# Patient Record
Sex: FEMALE | Race: BLACK OR AFRICAN AMERICAN | Employment: FULL TIME | URBAN - METROPOLITAN AREA
[De-identification: names, ages, dates, MRNs, and addresses within clinical notes are randomized per-mention and may not be internally consistent; named-entity substitution may affect disease eponyms.]

---

## 2020-09-17 ENCOUNTER — OFFICE VISIT (OUTPATIENT)
Dept: CARDIOLOGY CLINIC | Age: 55
End: 2020-09-17

## 2020-09-17 VITALS
SYSTOLIC BLOOD PRESSURE: 138 MMHG | DIASTOLIC BLOOD PRESSURE: 67 MMHG | TEMPERATURE: 97.1 F | HEIGHT: 61 IN | HEART RATE: 75 BPM | BODY MASS INDEX: 44.93 KG/M2 | WEIGHT: 238 LBS

## 2020-09-17 DIAGNOSIS — E66.01 SEVERE OBESITY (BMI >= 40) (HCC): ICD-10-CM

## 2020-09-17 DIAGNOSIS — R07.9 CHEST PAIN, UNSPECIFIED TYPE: Primary | ICD-10-CM

## 2020-09-17 DIAGNOSIS — I10 ESSENTIAL HYPERTENSION WITH GOAL BLOOD PRESSURE LESS THAN 130/85: ICD-10-CM

## 2020-09-17 DIAGNOSIS — R06.02 SOB (SHORTNESS OF BREATH) ON EXERTION: ICD-10-CM

## 2020-09-17 RX ORDER — METFORMIN HYDROCHLORIDE 1000 MG/1
1000 TABLET ORAL DAILY
Status: ON HOLD | COMMUNITY
End: 2020-11-03 | Stop reason: SDUPTHER

## 2020-09-17 NOTE — PROGRESS NOTES
HISTORY OF PRESENT ILLNESS  Bipin Miner is a 54 y.o. female. 2/16 Taking a new BP med since 4/26/16    Chest Pain (Angina)    The history is provided by the patient. This is a new problem. The current episode started more than 2 days ago (4 days). The problem has been rapidly improving. Duration of episode(s) is 20 minutes. The pain is associated with exertion and stress (walking 15 mts in mall). The pain is present in the lateral region, substernal region and left side. The quality of the pain is described as pressure-like. The pain does not radiate. Associated symptoms include dizziness, lower extremity edema, palpitations and shortness of breath. Pertinent negatives include no claudication, no cough, no diaphoresis, no fever, no headaches, no malaise/fatigue, no nausea, no orthopnea, no PND and no vomiting. She has tried nothing for the symptoms. Hypertension   The history is provided by the medical records. This is a chronic problem. Associated symptoms include chest pain and shortness of breath. Pertinent negatives include no headaches. Dizziness   The history is provided by the patient. This is a chronic (with SOB) problem. The current episode started more than 1 week ago (2016). The problem has not changed since onset. Associated symptoms include chest pain and shortness of breath. Pertinent negatives include no headaches. The symptoms are aggravated by bending. New Patient   The history is provided by the patient and medical records. Associated symptoms include chest pain and shortness of breath. Pertinent negatives include no headaches. Shortness of Breath   The history is provided by the patient. This is a new problem. The problem occurs intermittently. The current episode started more than 1 week ago (3/20). Associated symptoms include chest pain and leg swelling.  Pertinent negatives include no fever, no headaches, no cough, no wheezing, no PND, no orthopnea, no vomiting, no rash and no claudication. The problem's precipitants include exercise (15 mts of walk). Leg Swelling   The history is provided by the patient. This is a new problem. The current episode started more than 1 week ago. The problem occurs every several days. Associated symptoms include chest pain and shortness of breath. Pertinent negatives include no headaches. The symptoms are aggravated by standing. The symptoms are relieved by sleep. Review of Systems   Constitutional: Negative for chills, diaphoresis, fever, malaise/fatigue and weight loss. HENT: Negative for nosebleeds. Eyes: Negative for discharge. Respiratory: Positive for shortness of breath. Negative for cough and wheezing. Cardiovascular: Positive for chest pain, palpitations and leg swelling. Negative for orthopnea, claudication and PND. Gastrointestinal: Negative for diarrhea, nausea and vomiting. Genitourinary: Negative for dysuria and hematuria. Musculoskeletal: Negative for joint pain. Skin: Negative for rash. Neurological: Positive for dizziness. Negative for seizures, loss of consciousness and headaches. Endo/Heme/Allergies: Negative for polydipsia. Does not bruise/bleed easily. Psychiatric/Behavioral: Negative for depression and substance abuse. The patient does not have insomnia.       No Known Allergies    Past Medical History:   Diagnosis Date    Anemia     Diabetes ([facility] [address]     Fibroid tumor 2012    Hyperlipidemia     Hypertension     Type 2 diabetes mellitus ([facility] [address] 5/20/2016    poss neuropathy per pt        Family History   Problem Relation Age of Onset    Hypertension Mother     Heart Disease Mother     Cancer Mother     Hypertension Father     Heart Attack Neg Hx     Stroke Neg Hx        Social History     Tobacco Use    Smoking status: Never Smoker   Substance Use Topics    Alcohol use: No    Drug use: No        Current Outpatient Medications   Medication Sig    metFORMIN (GLUCOPHAGE) 1,000 mg tablet Take 1,000 mg by mouth daily.  metoprolol tartrate (LOPRESSOR) 25 mg tablet Take  by mouth two (2) times a day.  spironolactone (ALDACTONE) 25 mg tablet Take  by mouth daily.  losartan-hydrochlorothiazide (HYZAAR) 100-25 mg per tablet Take 1 Tab by mouth daily.  aspirin delayed-release 81 mg tablet Take  by mouth daily. No current facility-administered medications for this visit. History reviewed. No pertinent surgical history. Diagnostic Studies: Old records reviewed and show as follows  EKG tracings reviewed by me today. CARDIOLOGY STUDIES 6/2/2014   Exercise Nuclear Stress Test Result nl scan, nl EF   Echocardiogram - Complete Result 65%EF, mild sept LVH/DD, tr MR, ? PFO by color   Some recent data might be hidden   5/16 Nuc Stress  Diagnosis:    1. Nondiagnostic EKG portion of Lexiscan stress test.  2. Nuclear imaging report to follow. Conclusion:    1. Normal perfusion scan.    2. Normal wall motion and ejection fraction.    3. Low risk scan    Visit Vitals  /67   Pulse 75   Temp 97.1 °F (36.2 °C) (Temporal)   Ht 5' 1\" (1.549 m)   Wt 108 kg (238 lb)   BMI 44.97 kg/m²       Ms. Lucero Kauffman has a reminder for a \"due or due soon\" health maintenance. I have asked that she contact her primary care provider for follow-up on this health maintenance. Physical Exam   Constitutional: She is oriented to person, place, and time. She appears well-developed and well-nourished. No distress. sev obesity   HENT:   Head: Normocephalic and atraumatic. Mouth/Throat: Normal dentition. Eyes: Right eye exhibits no discharge. Left eye exhibits no discharge. No scleral icterus. Neck: Neck supple. No JVD present. Carotid bruit is not present. No thyromegaly present. Cardiovascular: Normal rate, regular rhythm, S1 normal, S2 normal and intact distal pulses. Exam reveals no gallop and no friction rub. Murmur heard.    Harsh early systolic murmur is present with a grade of 3/6 at the upper right sternal border. High-pitched blowing midsystolic murmur of grade 2/6 is also present at the apex. Pulmonary/Chest: Effort normal and breath sounds normal. She has no wheezes. She has no rales. Abdominal: Soft. She exhibits no mass. There is no abdominal tenderness. Musculoskeletal:         General: No edema. Lymphadenopathy:        Right cervical: No superficial cervical adenopathy present. Left cervical: No superficial cervical adenopathy present. Neurological: She is alert and oriented to person, place, and time. Skin: Skin is warm and dry. No rash noted. Psychiatric: She has a normal mood and affect. Her behavior is normal.       ASSESSMENT and PLAN    Patient has symptoms of chest pain and shortness of breath. Given multiple risk factors of hypertension, diabetes and hyperlipidemia, check echo and a stress test.  Patient advised to lose weight to avoid cardiovascular risks in future. She understands and will try. Continue the same medications for now. Diagnoses and all orders for this visit:    1. Chest pain, unspecified type  -     NUCLEAR CARDIAC STRESS TEST; Future    2. Essential hypertension with goal blood pressure less than 130/85  -     AMB POC EKG ROUTINE W/ 12 LEADS, INTER & REP    3. Severe obesity (BMI >= 40) (HCC)    4. SOB (shortness of breath) on exertion  -     ECHO ADULT COMPLETE; Future        Pertinent laboratory and test data reviewed and discussed with patient.   See patient instructions also for other medical advice given    Medications Discontinued During This Encounter   Medication Reason    cholecalciferol (VITAMIN D3) 1,000 unit tablet Therapy Completed    amLODIPine (NORVASC) 5 mg tablet     carvedilol (COREG) 25 mg tablet     VALSARTAN/HYDROCHLOROTHIAZIDE (DIOVAN HCT PO)        Follow-up and Dispositions    · Return in about 2 weeks (around 10/1/2020), or if symptoms worsen or fail to improve, for same day post test.

## 2020-09-17 NOTE — PATIENT INSTRUCTIONS
Medications Discontinued During This Encounter Medication Reason  cholecalciferol (VITAMIN D3) 1,000 unit tablet Therapy Completed  amLODIPine (NORVASC) 5 mg tablet  carvedilol (COREG) 25 mg tablet  VALSARTAN/HYDROCHLOROTHIAZIDE (DIOVAN HCT PO) Body Mass Index: Care Instructions Your Care Instructions Body mass index (BMI) can help you see if your weight is raising your risk for health problems. It uses a formula to compare how much you weigh with how tall you are. · A BMI lower than 18.5 is considered underweight. · A BMI between 18.5 and 24.9 is considered healthy. · A BMI between 25 and 29.9 is considered overweight. A BMI of 30 or higher is considered obese. If your BMI is in the normal range, it means that you have a lower risk for weight-related health problems. If your BMI is in the overweight or obese range, you may be at increased risk for weight-related health problems, such as high blood pressure, heart disease, stroke, arthritis or joint pain, and diabetes. If your BMI is in the underweight range, you may be at increased risk for health problems such as fatigue, lower protection (immunity) against illness, muscle loss, bone loss, hair loss, and hormone problems. BMI is just one measure of your risk for weight-related health problems. You may be at higher risk for health problems if you are not active, you eat an unhealthy diet, or you drink too much alcohol or use tobacco products. Follow-up care is a key part of your treatment and safety. Be sure to make and go to all appointments, and call your doctor if you are having problems. It's also a good idea to know your test results and keep a list of the medicines you take. How can you care for yourself at home? · Practice healthy eating habits. This includes eating plenty of fruits, vegetables, whole grains, lean protein, and low-fat dairy. · If your doctor recommends it, get more exercise.  Walking is a good choice. Bit by bit, increase the amount you walk every day. Try for at least 30 minutes on most days of the week. · Do not smoke. Smoking can increase your risk for health problems. If you need help quitting, talk to your doctor about stop-smoking programs and medicines. These can increase your chances of quitting for good. · Limit alcohol to 2 drinks a day for men and 1 drink a day for women. Too much alcohol can cause health problems. If you have a BMI higher than 25 · Your doctor may do other tests to check your risk for weight-related health problems. This may include measuring the distance around your waist. A waist measurement of more than 40 inches in men or 35 inches in women can increase the risk of weight-related health problems. · Talk with your doctor about steps you can take to stay healthy or improve your health. You may need to make lifestyle changes to lose weight and stay healthy, such as changing your diet and getting regular exercise. If you have a BMI lower than 18.5 · Your doctor may do other tests to check your risk for health problems. · Talk with your doctor about steps you can take to stay healthy or improve your health. You may need to make lifestyle changes to gain or maintain weight and stay healthy, such as getting more healthy foods in your diet and doing exercises to build muscle. Where can you learn more? Go to http://www.correa.com/ Enter S176 in the search box to learn more about \"Body Mass Index: Care Instructions. \" Current as of: December 11, 2019               Content Version: 12.6 © 9640-2356 [employer]. Care instructions adapted under license by [employer] (which disclaims liability or warranty for this information).  If you have questions about a medical condition or this instruction, always ask your healthcare professional. Antoinette Alfaro disclaims any warranty or liability for your use of this information. [employer] Thank you for requesting access to [employer]. Please follow the instructions below to securely access and download your online medical record. [employer] allows you to send messages to your doctor, view your test results, renew your prescriptions, schedule appointments, and more. How Do I Sign Up? 1. In your internet browser, go to [email or web address]. 2. Click on the First Time User? Click Here link in the Sign In box. You will see the New Member Sign Up page. 3. Enter your [employer] Access Code exactly as it appears below. You will not need to use this code after youve completed the sign-up process. If you do not sign up before the expiration date, you must request a new code. [employer] Access Code: [email or web address] Expires: 2020  1:14 PM (This is the date your [employer] access code will ) 4. Enter the last four digits of your Social Security Number (xxxx) and Date of Birth (mm/dd/yyyy) as indicated and click Submit. You will be taken to the next sign-up page. 5. Create a Favoe ID. This will be your [employer] login ID and cannot be changed, so think of one that is secure and easy to remember. 6. Create a [employer] password. You can change your password at any time. 7. Enter your Password Reset Question and Answer. This can be used at a later time if you forget your password. 8. Enter your e-mail address. You will receive e-mail notification when new information is available in [address] 9. Click Sign Up. You can now view and download portions of your medical record. 10. Click the Download Summary menu link to download a portable copy of your medical information. Additional Information If you have questions, please visit the Frequently Asked Questions section of the [employer] website at [email or web address] Remember, [employer] is NOT to be used for urgent needs.  For medical emergencies, dial 911.

## 2020-10-15 ENCOUNTER — OFFICE VISIT (OUTPATIENT)
Dept: CARDIOLOGY CLINIC | Age: 55
End: 2020-10-15
Payer: COMMERCIAL

## 2020-10-15 VITALS
DIASTOLIC BLOOD PRESSURE: 63 MMHG | HEART RATE: 56 BPM | WEIGHT: 236 LBS | HEIGHT: 61 IN | BODY MASS INDEX: 44.56 KG/M2 | SYSTOLIC BLOOD PRESSURE: 137 MMHG

## 2020-10-15 DIAGNOSIS — I25.110 CORONARY ARTERY DISEASE INVOLVING NATIVE CORONARY ARTERY OF NATIVE HEART WITH UNSTABLE ANGINA PECTORIS (HCC): ICD-10-CM

## 2020-10-15 DIAGNOSIS — I10 ESSENTIAL HYPERTENSION WITH GOAL BLOOD PRESSURE LESS THAN 130/85: ICD-10-CM

## 2020-10-15 DIAGNOSIS — I25.110 CORONARY ARTERY DISEASE INVOLVING NATIVE CORONARY ARTERY OF NATIVE HEART WITH UNSTABLE ANGINA PECTORIS (HCC): Primary | ICD-10-CM

## 2020-10-15 DIAGNOSIS — E66.01 SEVERE OBESITY (BMI >= 40) (HCC): ICD-10-CM

## 2020-10-15 PROCEDURE — 99214 OFFICE O/P EST MOD 30 MIN: CPT | Performed by: INTERNAL MEDICINE

## 2020-10-15 RX ORDER — AMLODIPINE BESYLATE 2.5 MG/1
2.5 TABLET ORAL DAILY
Qty: 30 TAB | Refills: 1 | Status: SHIPPED | OUTPATIENT
Start: 2020-10-15 | End: 2020-12-07

## 2020-10-15 RX ORDER — ASPIRIN 81 MG/1
162 TABLET ORAL DAILY
Qty: 100 TAB
Start: 2020-10-15 | End: 2020-11-03

## 2020-10-15 NOTE — H&P (VIEW-ONLY)
HISTORY OF PRESENT ILLNESS Sean Nixon is a 54 y.o. female. 2/16 Taking a new BP med since 4/26/16 Chest Pain (Angina) The history is provided by the patient. This is a new problem. The current episode started more than 1 week ago (4 days9/20). The problem has not changed since onset. Duration of episode(s) is 20 minutes. The pain is associated with exertion and stress (walking 15 mts in mall). The pain is present in the lateral region, substernal region and left side. The quality of the pain is described as pressure-like. The pain does not radiate. Associated symptoms include dizziness, lower extremity edema, palpitations and shortness of breath. Pertinent negatives include no claudication, no cough, no diaphoresis, no fever, no headaches, no malaise/fatigue, no nausea, no orthopnea, no PND and no vomiting. She has tried nothing for the symptoms. Hypertension The history is provided by the medical records. This is a chronic problem. Associated symptoms include chest pain and shortness of breath. Pertinent negatives include no headaches. Dizziness The history is provided by the patient. This is a chronic (with SOB) problem. The current episode started more than 1 week ago (2016). The problem has not changed since onset. Associated symptoms include chest pain and shortness of breath. Pertinent negatives include no headaches. The symptoms are aggravated by bending. Shortness of Breath The history is provided by the patient. This is a new problem. The problem occurs intermittently. The current episode started more than 1 week ago (3/20). The problem has not changed since onset. Associated symptoms include chest pain and leg swelling. Pertinent negatives include no fever, no headaches, no cough, no wheezing, no PND, no orthopnea, no vomiting, no rash and no claudication. The problem's precipitants include exercise (15 mts of walk). Leg Swelling The history is provided by the patient. This is a new problem. The current episode started more than 1 week ago. The problem occurs every several days. The problem has not changed since onset. Associated symptoms include chest pain and shortness of breath. Pertinent negatives include no headaches. The symptoms are aggravated by standing. The symptoms are relieved by sleep. Cholesterol Problem The history is provided by the medical records. This is a chronic problem. Associated symptoms include chest pain and shortness of breath. Pertinent negatives include no headaches. Review of Systems Constitutional: Negative for chills, diaphoresis, fever, malaise/fatigue and weight loss. HENT: Negative for nosebleeds. Eyes: Negative for discharge. Respiratory: Positive for shortness of breath. Negative for cough and wheezing. Cardiovascular: Positive for chest pain, palpitations and leg swelling. Negative for orthopnea, claudication and PND. Gastrointestinal: Negative for diarrhea, nausea and vomiting. Genitourinary: Negative for dysuria and hematuria. Musculoskeletal: Negative for joint pain. Skin: Negative for rash. Neurological: Positive for dizziness. Negative for seizures, loss of consciousness and headaches. Endo/Heme/Allergies: Negative for polydipsia. Does not bruise/bleed easily. Psychiatric/Behavioral: Negative for depression and substance abuse. The patient does not have insomnia. No Known Allergies Past Medical History:  
Diagnosis Date  Anemia  Diabetes ([address]  Fibroid tumor 2012  Hyperlipidemia  Hypertension  Type 2 diabetes mellitus ([facility] [address] 5/20/2016 poss neuropathy per pt Family History Problem Relation Age of Onset  Hypertension Mother  Heart Disease Mother  Cancer Mother  Hypertension Father  Heart Attack Neg Hx  Stroke Neg Hx Social History Tobacco Use  Smoking status: Never Smoker  Smokeless tobacco: Never Used Substance Use Topics  Alcohol use: No  
 Drug use: No  
  
 
Current Outpatient Medications Medication Sig  
 metFORMIN (GLUCOPHAGE) 1,000 mg tablet Take 1,000 mg by mouth daily.  metoprolol tartrate (LOPRESSOR) 25 mg tablet Take  by mouth two (2) times a day.  spironolactone (ALDACTONE) 25 mg tablet Take  by mouth daily.  losartan-hydrochlorothiazide (HYZAAR) 100-25 mg per tablet Take 1 Tab by mouth daily.  aspirin delayed-release 81 mg tablet Take  by mouth daily. No current facility-administered medications for this visit. Facility-Administered Medications Ordered in Other Visits Medication  regadenoson (LEXISCAN) injection 0.4 mg  
 technetium TC-99 tetrofosimin (TC-MYOVIEW) 1.38 mg injection 9.8 millicurie  technetium TC-99 tetrofosimin (TC-MYOVIEW) 1.38 mg injection 26-31 millicurie History reviewed. No pertinent surgical history. Diagnostic Studies: Old records reviewed and show as follows EKG tracings reviewed by me today. CARDIOLOGY STUDIES 6/2/2014 Exercise Nuclear Stress Test Result nl scan, nl EF Echocardiogram - Complete Result 65%EF, mild sept LVH/DD, tr MR, ? PFO by color Some recent data might be hidden 5/16 Nuc Stress Diagnosis:   
1. Nondiagnostic EKG portion of Lexiscan stress test. 
2. Nuclear imaging report to follow. Conclusion:   
1. Normal perfusion scan.   
2. Normal wall motion and ejection fraction.   
3. Low risk scan 
10/15/20 ECHO ADULT COMPLETE 10/15/2020 10/15/2020 Narrative · LV: Estimated LVEF is 60 - 65%. Normal cavity size, wall thickness and  
systolic function (ejection fraction normal). Wall motion: normal. Mild  
(grade 1) left ventricular diastolic dysfunction. · RV: Normal right ventricular size and function. · MV: Mild mitral annular calcification. Mild mitral valve regurgitation  
is present. · PV: Mild pulmonic valve regurgitation is present. Signed by: Allison Gupta MD  
 
10/15/20 NUCLEAR CARDIAC STRESS TEST 10/15/2020 10/15/2020 Narrative · Baseline ECG: Normal sinus rhythm, non-specific ST-T wave abnormalities. · Negative stress test. 
· Gated SPECT: Left ventricular function post-stress was normal.  
Calculated ejection fraction is 90%. There is no evidence of transient  
ischemic dilation (TID). The TID ratio is 0.82. · Myocardial perfusion imaging defect 1: There is a defect that is small  
to moderate in size with a mild reduction in uptake present in the  
basal-mid anterior location(s) that is reversible. There is normal wall  
motion in the defect area. Viability in the area is good. The defect  
appears to be ischemia caused by breast attenuation. The possibility of  
artifact cannot be excluded. · Myocardial perfusion imaging defect 1: caused by breast attenuation. · Left ventricular perfusion is abnormal. 
· Myocardial perfusion imaging supports an intermediate risk stress test. 
   
  Signed by: Allison Gupta MD  
 
Visit Vitals /63 Pulse (!) 56 Ht 5' 1\" (1.549 m) Wt 107 kg (236 lb) BMI 44.59 kg/m² Ms. Estephania Collazo has a reminder for a \"due or due soon\" health maintenance. I have asked that she contact her primary care provider for follow-up on this health maintenance. Physical Exam  
Constitutional: She is oriented to person, place, and time. She appears well-developed and well-nourished. No distress. sev obesity HENT:  
Head: Normocephalic and atraumatic. Mouth/Throat: Normal dentition. Eyes: Right eye exhibits no discharge. Left eye exhibits no discharge. No scleral icterus. Neck: Neck supple. No JVD present. Carotid bruit is not present. No thyromegaly present. Cardiovascular: Normal rate, regular rhythm, S1 normal, S2 normal and intact distal pulses. Exam reveals no gallop and no friction rub. Murmur heard. Harsh early systolic murmur is present with a grade of 3/6 at the upper right sternal border. High-pitched blowing midsystolic murmur of grade 2/6 is also present at the apex. Pulmonary/Chest: Effort normal and breath sounds normal. She has no wheezes. She has no rales. Abdominal: Soft. She exhibits no mass. There is no abdominal tenderness. Musculoskeletal:     
   General: No edema. Lymphadenopathy:  
     Right cervical: No superficial cervical adenopathy present. Left cervical: No superficial cervical adenopathy present. Neurological: She is alert and oriented to person, place, and time. Skin: Skin is warm and dry. No rash noted. Psychiatric: She has a normal mood and affect. Her behavior is normal.  
 
 
ASSESSMENT and PLAN Patient has symptoms of chest pain and shortness of breath. Given multiple risk factors of hypertension, diabetes and hyperlipidemia, check echo and a stress test. 
Patient advised to lose weight to avoid cardiovascular risks in future. She understands and will try. Continue the same medications for now. 10/20 echo has shown normal ejection fraction and no significant valve disease. Nuclear stress test that showed reversible anterior ischemia indicating likely LAD disease. She will need a cardiac catheterization. Procedure discussed along with risks and complications she agrees. Add Norvasc 2.5 mg a day. The benefits and risks of cardiac catheterization have been discussed in detail with the patient present at the time.  Patient understands  risk of potential cath complications including but not limited to bleeding, infection, difficulty healing the arteriotomy access site(2 chances in 100) which may require surgical repair, potential thromboembolic complications which could result in stroke, myocardial infarction, vascular injury, loss of limb or organ function and/or death( 1 chance in 1000)and potential allergic reaction to contrast dye or other medication used during the procedure. Patient is also aware of the therapeutic implications for medical management vs coronary artery bypass surgery vs percutaneous coronary intervention in treatment of coronary artery disease. The additional risks for percutaneous coronary artery intervention include the need for emergent bypass surgery at 1 chance in 100 and for restenosis which may range from 35% for plain balloon angioplasty, 15% for bare metal stent, and 5% for drug eluting stent implants. The need for mandatory uninterrupted dual antiplatelet therapy with lifelong Aspirin combined with Plavix for up to 12 months following drug eluting stents, and minimum 1 month following bare metal stents to prevent stent thrombosis which is the equivalent of acute heart attack has been reviewed in detail. No contraindications to use of drug eluting stents has been discovered. Diagnoses and all orders for this visit: 1. Coronary artery disease involving native coronary artery of native heart with unstable angina pectoris ([address] -     aspirin delayed-release 81 mg tablet; Take 2 Tabs by mouth daily. 
-     PROTHROMBIN TIME + INR; Future -     METABOLIC PANEL, BASIC; Future -     CBC W/O DIFF; Future -     XR CHEST PA LAT; Future -     URINALYSIS W/ RFLX MICROSCOPIC; Future 
-     PTT; Future 2. Essential hypertension with goal blood pressure less than 130/85 
-     PROTHROMBIN TIME + INR; Future -     METABOLIC PANEL, BASIC; Future -     CBC W/O DIFF; Future -     XR CHEST PA LAT; Future -     URINALYSIS W/ RFLX MICROSCOPIC; Future 
-     PTT; Future 3. Severe obesity (BMI >= 40) ([facility]) Other orders 
-     amLODIPine (NORVASC) 2.5 mg tablet; Take 1 Tab by mouth daily. Pertinent laboratory and test data reviewed and discussed with patient. See patient instructions also for other medical advice given Medications Discontinued During This Encounter Medication Reason  aspirin delayed-release 81 mg tablet Reorder Follow-up and Dispositions · Return in about 2 months (around 12/15/2020), or if symptoms worsen or fail to improve, for post procedure.

## 2020-10-15 NOTE — PATIENT INSTRUCTIONS
Medications Discontinued During This Encounter Medication Reason  aspirin delayed-release 81 mg tablet Reorder Coronary Angiogram: About This Test 
What is a coronary angiogram? 
 
A coronary angiogram is a test to look at the large blood vessels of your heart (coronary arteries). These blood vessels feed blood, oxygen, and nutrients to your heart. Why is this test done? This test is done to check blood flow in your coronary arteries. It can show the size and location of narrowed or blocked sections of an artery. It's done for people who have coronary artery disease, also known as heart disease. The test can show how serious the disease is and how best to treat it. Or it can be done for people who have symptoms of heart disease to find out if there is a problem with the artery. How is the test done? · You will get medicine to help you relax. · A thin tube called a catheter is put into a blood vessel in your groin or arm. · You will get a shot to numb the skin where the catheter goes in. You may feel pressure when the doctor moves the catheter through your blood vessel into your heart. · Dye is put into your coronary arteries through the catheter. Your doctor can see the dye as it moves through the arteries. This lets your doctor look for areas that are narrowed or blocked. · You may feel hot or flushed for several seconds when the dye is put in. 
· If you have a narrowed or blocked artery, the doctor may do an angioplasty or a coronary stent procedure. These procedures make more room for blood to flow. How long does it take? The test will take about 30 minutes. But you need time to get ready for it and time to recover. If you also have angioplasty and possibly a stent placed after the test, the whole procedure may take a few hours.  
What happens after the test? 
· You will stay in a room for at least a few hours to make sure the catheter site starts to heal. You may have a bandage or a compression device on your groin or arm to prevent bleeding. · If the catheter was placed in your groin, you may lie in bed for a few hours. If the catheter was put in your arm, you will need to keep your arm still for at least 1 hour. · You may be able to go home later the same day, or you may need to stay in the hospital overnight. You will get more instructions for what to do at home. · Drink plenty of fluids for several hours after the test. 
Follow-up care is a key part of your treatment and safety. Be sure to make and go to all appointments, and call your doctor if you are having problems. It's also a good idea to know your test results and keep a list of the medicines you take. Where can you learn more? Go to http://www.gray.com/ Enter A446 in the search box to learn more about \"Coronary Angiogram: About This Test.\" Current as of: December 16, 2019               Content Version: 12.6 © 5086-6196 [employer]. Care instructions adapted under license by [employer] (which disclaims liability or warranty for this information). If you have questions about a medical condition or this instruction, always ask your healthcare professional. [address] any warranty or liability for your use of this information. Instructions Patients Name:  Yudelka Kaba 1. You are scheduled to have a Cath/PCI on 11/3/2020  at [facility] Please check in at          . 2. Please go to [facility] and park in the outpatient parking lot that is located around to the back of the hospital and enter through the [facility] building. Once you enter through the [facility] check in with the  there. The  will either give you directions or assist you in getting to the cath holding area. 3. You are not to eat anything after midnight before the procedure. Please continue to drink fluids up until 12:00.  (water, juice, black coffee, soft drinks). Do not drink milk or milk products or anything with cream. You may take medications(except for diabetes) with a small sip of water before 6am on the day of the procedure. Sunny Mccrary 4. If you are diabetic, do not take your insulin/sugar pill the morning of the procedure. 5. MEDICATION INSTRUCTIONS:   Please take your morning medications with the following special instructions: 
 
[x]          Please make sure to take your Blood pressure medication : With just enough water to swallow. [x]          Take your Aspirin and/or Plavix. With just enough water to swallow. []          Stop your Coumadin on and do not resume it until after the procedure.  
 
[]          Take Prednisone 60 mg and Benadryl 25 mg by mouth at Bedtime on  and again on  at . This is to prevent you from having an allergic reaction to the dye. 6. We encourage families to wait in the waiting room on the first floor while the procedure is being done. The Doctor will come out and talk with you as soon as the procedure is over. 7. There is the possibility that you may spend the night in the hospital, depending on the results of the procedure. This will be determined after the procedure is done. If angioplasty or stent is planned, you will stay at least one day. 8. If you or your family have any questions, please call our office Monday Friday, 9:00 a. m.4:30 p.m.,  At [phone], and ask to speak to one of the nurses.

## 2020-10-15 NOTE — PROGRESS NOTES
HISTORY OF PRESENT ILLNESS  Mitzi Corral is a 54 y.o. female. 2/16 Taking a new BP med since 4/26/16    Chest Pain (Angina)    The history is provided by the patient. This is a new problem. The current episode started more than 1 week ago (4 days9/20). The problem has not changed since onset. Duration of episode(s) is 20 minutes. The pain is associated with exertion and stress (walking 15 mts in mall). The pain is present in the lateral region, substernal region and left side. The quality of the pain is described as pressure-like. The pain does not radiate. Associated symptoms include dizziness, lower extremity edema, palpitations and shortness of breath. Pertinent negatives include no claudication, no cough, no diaphoresis, no fever, no headaches, no malaise/fatigue, no nausea, no orthopnea, no PND and no vomiting. She has tried nothing for the symptoms. Hypertension   The history is provided by the medical records. This is a chronic problem. Associated symptoms include chest pain and shortness of breath. Pertinent negatives include no headaches. Dizziness   The history is provided by the patient. This is a chronic (with SOB) problem. The current episode started more than 1 week ago (2016). The problem has not changed since onset. Associated symptoms include chest pain and shortness of breath. Pertinent negatives include no headaches. The symptoms are aggravated by bending. Shortness of Breath   The history is provided by the patient. This is a new problem. The problem occurs intermittently. The current episode started more than 1 week ago (3/20). The problem has not changed since onset. Associated symptoms include chest pain and leg swelling. Pertinent negatives include no fever, no headaches, no cough, no wheezing, no PND, no orthopnea, no vomiting, no rash and no claudication. The problem's precipitants include exercise (15 mts of walk). Leg Swelling   The history is provided by the patient.  This is a new problem. The current episode started more than 1 week ago. The problem occurs every several days. The problem has not changed since onset. Associated symptoms include chest pain and shortness of breath. Pertinent negatives include no headaches. The symptoms are aggravated by standing. The symptoms are relieved by sleep. Cholesterol Problem   The history is provided by the medical records. This is a chronic problem. Associated symptoms include chest pain and shortness of breath. Pertinent negatives include no headaches. Review of Systems   Constitutional: Negative for chills, diaphoresis, fever, malaise/fatigue and weight loss. HENT: Negative for nosebleeds. Eyes: Negative for discharge. Respiratory: Positive for shortness of breath. Negative for cough and wheezing. Cardiovascular: Positive for chest pain, palpitations and leg swelling. Negative for orthopnea, claudication and PND. Gastrointestinal: Negative for diarrhea, nausea and vomiting. Genitourinary: Negative for dysuria and hematuria. Musculoskeletal: Negative for joint pain. Skin: Negative for rash. Neurological: Positive for dizziness. Negative for seizures, loss of consciousness and headaches. Endo/Heme/Allergies: Negative for polydipsia. Does not bruise/bleed easily. Psychiatric/Behavioral: Negative for depression and substance abuse. The patient does not have insomnia.       No Known Allergies    Past Medical History:   Diagnosis Date    Anemia     Diabetes [facility] [address]     Fibroid tumor 2012    Hyperlipidemia     Hypertension     Type 2 diabetes mellitus ([facility] [address] 5/20/2016    poss neuropathy per pt        Family History   Problem Relation Age of Onset    Hypertension Mother     Heart Disease Mother     Cancer Mother     Hypertension Father     Heart Attack Neg Hx     Stroke Neg Hx        Social History     Tobacco Use    Smoking status: Never Smoker    Smokeless tobacco: Never Used   Substance Use Topics    Alcohol use: No    Drug use: No        Current Outpatient Medications   Medication Sig    metFORMIN (GLUCOPHAGE) 1,000 mg tablet Take 1,000 mg by mouth daily.  metoprolol tartrate (LOPRESSOR) 25 mg tablet Take  by mouth two (2) times a day.  spironolactone (ALDACTONE) 25 mg tablet Take  by mouth daily.  losartan-hydrochlorothiazide (HYZAAR) 100-25 mg per tablet Take 1 Tab by mouth daily.  aspirin delayed-release 81 mg tablet Take  by mouth daily. No current facility-administered medications for this visit. Facility-Administered Medications Ordered in Other Visits   Medication    regadenoson (LEXISCAN) injection 0.4 mg    technetium TC-99 tetrofosimin (TC-MYOVIEW) 1.38 mg injection 9.8 millicurie    technetium TC-99 tetrofosimin (TC-MYOVIEW) 1.38 mg injection 02-28 millicurie        History reviewed. No pertinent surgical history. Diagnostic Studies: Old records reviewed and show as follows  EKG tracings reviewed by me today. CARDIOLOGY STUDIES 6/2/2014   Exercise Nuclear Stress Test Result nl scan, nl EF   Echocardiogram - Complete Result 65%EF, mild sept LVH/DD, tr MR, ? PFO by color   Some recent data might be hidden   5/16 Nuc Stress  Diagnosis:    1. Nondiagnostic EKG portion of Lexiscan stress test.  2. Nuclear imaging report to follow. Conclusion:    1. Normal perfusion scan.    2. Normal wall motion and ejection fraction.    3. Low risk scan  10/15/20   ECHO ADULT COMPLETE 10/15/2020 10/15/2020    Narrative · LV: Estimated LVEF is 60 - 65%. Normal cavity size, wall thickness and   systolic function (ejection fraction normal). Wall motion: normal. Mild   (grade 1) left ventricular diastolic dysfunction. · RV: Normal right ventricular size and function. · MV: Mild mitral annular calcification. Mild mitral valve regurgitation   is present. · PV: Mild pulmonic valve regurgitation is present.         Signed by: Kisha Blancas MD     10/15/20   NUCLEAR CARDIAC STRESS TEST 10/15/2020 10/15/2020    Narrative · Baseline ECG: Normal sinus rhythm, non-specific ST-T wave abnormalities. · Negative stress test.  · Gated SPECT: Left ventricular function post-stress was normal.   Calculated ejection fraction is 90%. There is no evidence of transient   ischemic dilation (TID). The TID ratio is 0.82. · Myocardial perfusion imaging defect 1: There is a defect that is small   to moderate in size with a mild reduction in uptake present in the   basal-mid anterior location(s) that is reversible. There is normal wall   motion in the defect area. Viability in the area is good. The defect   appears to be ischemia caused by breast attenuation. The possibility of   artifact cannot be excluded. · Myocardial perfusion imaging defect 1: caused by breast attenuation. · Left ventricular perfusion is abnormal.  · Myocardial perfusion imaging supports an intermediate risk stress test.        Signed by: Chan Hines MD     Visit Vitals  /63   Pulse (!) 56   Ht 5' 1\" (1.549 m)   Wt 107 kg (236 lb)   BMI 44.59 kg/m²       Ms. Jayla Ely has a reminder for a \"due or due soon\" health maintenance. I have asked that she contact her primary care provider for follow-up on this health maintenance. Physical Exam   Constitutional: She is oriented to person, place, and time. She appears well-developed and well-nourished. No distress. sev obesity   HENT:   Head: Normocephalic and atraumatic. Mouth/Throat: Normal dentition. Eyes: Right eye exhibits no discharge. Left eye exhibits no discharge. No scleral icterus. Neck: Neck supple. No JVD present. Carotid bruit is not present. No thyromegaly present. Cardiovascular: Normal rate, regular rhythm, S1 normal, S2 normal and intact distal pulses. Exam reveals no gallop and no friction rub. Murmur heard. Harsh early systolic murmur is present with a grade of 3/6 at the upper right sternal border.   High-pitched blowing midsystolic murmur of grade 2/6 is also present at the apex. Pulmonary/Chest: Effort normal and breath sounds normal. She has no wheezes. She has no rales. Abdominal: Soft. She exhibits no mass. There is no abdominal tenderness. Musculoskeletal:         General: No edema. Lymphadenopathy:        Right cervical: No superficial cervical adenopathy present. Left cervical: No superficial cervical adenopathy present. Neurological: She is alert and oriented to person, place, and time. Skin: Skin is warm and dry. No rash noted. Psychiatric: She has a normal mood and affect. Her behavior is normal.       ASSESSMENT and PLAN    Patient has symptoms of chest pain and shortness of breath. Given multiple risk factors of hypertension, diabetes and hyperlipidemia, check echo and a stress test.  Patient advised to lose weight to avoid cardiovascular risks in future. She understands and will try. Continue the same medications for now. 10/20 echo has shown normal ejection fraction and no significant valve disease. Nuclear stress test that showed reversible anterior ischemia indicating likely LAD disease. She will need a cardiac catheterization. Procedure discussed along with risks and complications she agrees. Add Norvasc 2.5 mg a day. The benefits and risks of cardiac catheterization have been discussed in detail with the patient present at the time. Patient understands  risk of potential cath complications including but not limited to bleeding, infection, difficulty healing the arteriotomy access site(2 chances in 100) which may require surgical repair, potential thromboembolic complications which could result in stroke, myocardial infarction, vascular injury, loss of limb or organ function and/or death( 1 chance in 1000)and potential allergic reaction to contrast dye or other medication used during the procedure.  Patient is also aware of the therapeutic implications for medical management vs coronary artery bypass surgery vs percutaneous coronary intervention in treatment of coronary artery disease. The additional risks for percutaneous coronary artery intervention include the need for emergent bypass surgery at 1 chance in 100 and for restenosis which may range from 35% for plain balloon angioplasty, 15% for bare metal stent, and 5% for drug eluting stent implants. The need for mandatory uninterrupted dual antiplatelet therapy with lifelong Aspirin combined with Plavix for up to 12 months following drug eluting stents, and minimum 1 month following bare metal stents to prevent stent thrombosis which is the equivalent of acute heart attack has been reviewed in detail. No contraindications to use of drug eluting stents has been discovered. Diagnoses and all orders for this visit:    1. Coronary artery disease involving native coronary artery of native heart with unstable angina pectoris (HCC)  -     aspirin delayed-release 81 mg tablet; Take 2 Tabs by mouth daily.  -     PROTHROMBIN TIME + INR; Future  -     METABOLIC PANEL, BASIC; Future  -     CBC W/O DIFF; Future  -     XR CHEST PA LAT; Future  -     URINALYSIS W/ RFLX MICROSCOPIC; Future  -     PTT; Future    2. Essential hypertension with goal blood pressure less than 130/85  -     PROTHROMBIN TIME + INR; Future  -     METABOLIC PANEL, BASIC; Future  -     CBC W/O DIFF; Future  -     XR CHEST PA LAT; Future  -     URINALYSIS W/ RFLX MICROSCOPIC; Future  -     PTT; Future    3. Severe obesity (BMI >= 40) ([facility])    Other orders  -     amLODIPine (NORVASC) 2.5 mg tablet; Take 1 Tab by mouth daily. Pertinent laboratory and test data reviewed and discussed with patient.   See patient instructions also for other medical advice given    Medications Discontinued During This Encounter   Medication Reason    aspirin delayed-release 81 mg tablet Reorder       Follow-up and Dispositions    · Return in about 2 months (around 12/15/2020), or if symptoms worsen or fail to improve, for post procedure.

## 2020-10-27 LAB
ANION GAP SERPL CALC-SCNC: 10 MMOL/L (ref 3–15)
APTT PPP: 23 SEC (ref 22–36)
BILIRUB UR QL: NEGATIVE
BUN SERPL-MCNC: 15 MG/DL (ref 6–22)
CALCIUM SERPL-MCNC: 9.9 MG/DL (ref 8.4–10.5)
CHLORIDE SERPL-SCNC: 100 MMOL/L (ref 98–110)
CLARITY: CLEAR
CO2 SERPL-SCNC: 29 MMOL/L (ref 20–32)
COLOR UR: YELLOW
CREAT SERPL-MCNC: 0.8 MG/DL (ref 0.5–1.2)
EPITHELIAL,EPSU: ABNORMAL /HPF (ref 0–2)
ERYTHROCYTE [DISTWIDTH] IN BLOOD BY AUTOMATED COUNT: 12.4 % (ref 10–15.5)
GFRAA, 66117: >60
GFRNA, 66118: >60
GLUCOSE SERPL-MCNC: 104 MG/DL (ref 70–99)
GLUCOSE UR QL: NEGATIVE MG/DL
HCT VFR BLD AUTO: 44 % (ref 35.1–48)
HGB BLD-MCNC: 14.4 G/DL (ref 11.7–16)
HGB UR QL STRIP: ABNORMAL
INR PPP: 1.08 (ref 0.89–1.29)
KETONES UR QL STRIP.AUTO: NEGATIVE MG/DL
LEUKOCYTE ESTERASE: NEGATIVE
MCH RBC QN AUTO: 30 PG (ref 26–34)
MCHC RBC AUTO-ENTMCNC: 33 G/DL (ref 31–36)
MCV RBC AUTO: 91 FL (ref 81–99)
NITRITE UR QL STRIP.AUTO: NEGATIVE
PH UR STRIP: 5 PH (ref 5–8)
PLATELET # BLD AUTO: 225 K/UL (ref 140–440)
PMV BLD AUTO: 10.4 FL (ref 9–13)
POTASSIUM SERPL-SCNC: 3.7 MMOL/L (ref 3.5–5.5)
PROT UR QL STRIP: NEGATIVE MG/DL
PROTHROMBIN TIME: 11.3 SEC (ref 9–13)
RBC # BLD AUTO: 4.84 M/UL (ref 3.8–5.2)
RBC #/AREA URNS HPF: ABNORMAL /HPF
SODIUM SERPL-SCNC: 139 MMOL/L (ref 133–145)
SP GR UR: 1.02 (ref 1–1.03)
URINE ASCORBIC ACID: NEGATIVE MG/DL
UROBILINOGEN UR STRIP-MCNC: <2 MG/DL
WBC # BLD AUTO: 4.3 K/UL (ref 4–11)
WBC URNS QL MICRO: ABNORMAL /HPF (ref 0–2)

## 2020-11-03 ENCOUNTER — HOSPITAL ENCOUNTER (OUTPATIENT)
Age: 55
Setting detail: OUTPATIENT SURGERY
Discharge: HOME | End: 2020-11-03
Attending: INTERNAL MEDICINE | Admitting: INTERNAL MEDICINE
Payer: COMMERCIAL

## 2020-11-03 VITALS
RESPIRATION RATE: 15 BRPM | DIASTOLIC BLOOD PRESSURE: 68 MMHG | OXYGEN SATURATION: 100 % | BODY MASS INDEX: 44.56 KG/M2 | WEIGHT: 236 LBS | HEART RATE: 54 BPM | HEIGHT: 61 IN | SYSTOLIC BLOOD PRESSURE: 103 MMHG

## 2020-11-03 DIAGNOSIS — I20.8 ATYPICAL ANGINA (HCC): ICD-10-CM

## 2020-11-03 DIAGNOSIS — I25.10 CAD (CORONARY ARTERY DISEASE): ICD-10-CM

## 2020-11-03 LAB
ATRIAL RATE: 57 BPM
CALCULATED P AXIS, ECG09: 49 DEGREES
CALCULATED R AXIS, ECG10: -10 DEGREES
CALCULATED T AXIS, ECG11: 17 DEGREES
DIAGNOSIS, 93000: NORMAL
P-R INTERVAL, ECG05: 186 MS
Q-T INTERVAL, ECG07: 442 MS
QRS DURATION, ECG06: 86 MS
QTC CALCULATION (BEZET), ECG08: 430 MS
VENTRICULAR RATE, ECG03: 57 BPM

## 2020-11-03 PROCEDURE — 93454 CORONARY ARTERY ANGIO S&I: CPT | Performed by: INTERNAL MEDICINE

## 2020-11-03 PROCEDURE — 99152 MOD SED SAME PHYS/QHP 5/>YRS: CPT | Performed by: INTERNAL MEDICINE

## 2020-11-03 PROCEDURE — 74011000250 HC RX REV CODE- 250: Performed by: INTERNAL MEDICINE

## 2020-11-03 PROCEDURE — C1894 INTRO/SHEATH, NON-LASER: HCPCS | Performed by: INTERNAL MEDICINE

## 2020-11-03 PROCEDURE — 77030015766: Performed by: INTERNAL MEDICINE

## 2020-11-03 PROCEDURE — 93005 ELECTROCARDIOGRAM TRACING: CPT

## 2020-11-03 PROCEDURE — 74011250636 HC RX REV CODE- 250/636: Performed by: INTERNAL MEDICINE

## 2020-11-03 PROCEDURE — 77030027845 HC BND COM RDL D-STAT TELE -B: Performed by: INTERNAL MEDICINE

## 2020-11-03 PROCEDURE — 77030013797 HC KT TRNSDUC PRSSR EDWD -A: Performed by: INTERNAL MEDICINE

## 2020-11-03 PROCEDURE — 74011000636 HC RX REV CODE- 636: Performed by: INTERNAL MEDICINE

## 2020-11-03 RX ORDER — MIDAZOLAM HYDROCHLORIDE 1 MG/ML
INJECTION, SOLUTION INTRAMUSCULAR; INTRAVENOUS AS NEEDED
Status: DISCONTINUED | OUTPATIENT
Start: 2020-11-03 | End: 2020-11-03 | Stop reason: HOSPADM

## 2020-11-03 RX ORDER — SODIUM CHLORIDE 0.9 % (FLUSH) 0.9 %
5-40 SYRINGE (ML) INJECTION AS NEEDED
Status: DISCONTINUED | OUTPATIENT
Start: 2020-11-03 | End: 2020-11-03 | Stop reason: HOSPADM

## 2020-11-03 RX ORDER — ASPIRIN 325 MG
325 TABLET ORAL DAILY
Status: DISCONTINUED | OUTPATIENT
Start: 2020-11-03 | End: 2020-11-03 | Stop reason: HOSPADM

## 2020-11-03 RX ORDER — LIDOCAINE HYDROCHLORIDE 10 MG/ML
INJECTION, SOLUTION EPIDURAL; INFILTRATION; INTRACAUDAL; PERINEURAL AS NEEDED
Status: DISCONTINUED | OUTPATIENT
Start: 2020-11-03 | End: 2020-11-03 | Stop reason: HOSPADM

## 2020-11-03 RX ORDER — NITROGLYCERIN 0.4 MG/1
0.4 TABLET SUBLINGUAL
Status: DISCONTINUED | OUTPATIENT
Start: 2020-11-03 | End: 2020-11-03 | Stop reason: HOSPADM

## 2020-11-03 RX ORDER — VERAPAMIL HYDROCHLORIDE 2.5 MG/ML
INJECTION, SOLUTION INTRAVENOUS AS NEEDED
Status: DISCONTINUED | OUTPATIENT
Start: 2020-11-03 | End: 2020-11-03 | Stop reason: HOSPADM

## 2020-11-03 RX ORDER — SODIUM CHLORIDE 9 MG/ML
100 INJECTION, SOLUTION INTRAVENOUS CONTINUOUS
Status: DISPENSED | OUTPATIENT
Start: 2020-11-03 | End: 2020-11-03

## 2020-11-03 RX ORDER — HEPARIN SODIUM 1000 [USP'U]/ML
INJECTION, SOLUTION INTRAVENOUS; SUBCUTANEOUS AS NEEDED
Status: DISCONTINUED | OUTPATIENT
Start: 2020-11-03 | End: 2020-11-03 | Stop reason: HOSPADM

## 2020-11-03 RX ORDER — METFORMIN HYDROCHLORIDE 1000 MG/1
1000 TABLET ORAL DAILY
Qty: 60 TAB | Refills: 0 | Status: SHIPPED
Start: 2020-11-05

## 2020-11-03 RX ORDER — FENTANYL CITRATE 50 UG/ML
INJECTION, SOLUTION INTRAMUSCULAR; INTRAVENOUS AS NEEDED
Status: DISCONTINUED | OUTPATIENT
Start: 2020-11-03 | End: 2020-11-03 | Stop reason: HOSPADM

## 2020-11-03 RX ORDER — SODIUM CHLORIDE 0.9 % (FLUSH) 0.9 %
5-40 SYRINGE (ML) INJECTION EVERY 8 HOURS
Status: DISCONTINUED | OUTPATIENT
Start: 2020-11-03 | End: 2020-11-03 | Stop reason: HOSPADM

## 2020-11-03 RX ORDER — SODIUM CHLORIDE 9 MG/ML
100 INJECTION, SOLUTION INTRAVENOUS CONTINUOUS
Status: DISCONTINUED | OUTPATIENT
Start: 2020-11-03 | End: 2020-11-03 | Stop reason: HOSPADM

## 2020-11-03 NOTE — Clinical Note
TRANSFER - IN REPORT:     Verbal report received from: SIXTO JACKSON) [facility]. Report consisted of patient's Situation, Background, Assessment and   Recommendations(SBAR). Opportunity for questions and clarification was provided. Assessment completed upon patient's arrival to unit and care assumed. Patient transported with a Cardiac Cath Tech / Patient Care Tech.

## 2020-11-03 NOTE — DISCHARGE INSTRUCTIONS
HEART CATHETERIZATION/ANGIOGRAPHY DISCHARGE INSTRUCTIONS    1. Check puncture site frequently for swelling or bleeding. If there is any bleeding, lie down and apply pressure over the area with a clean towel or washcloth. Notify your doctor for any redness, swelling, drainage, or oozing from the puncture site. Notify your doctor for any fever or chills. 2. If the extremity becomes cold, numb, or painful go to the Emergency Room. 3. Activity should be limited for the next 48 hours. Climb stairs as little as possible and avoid any stooping, bending, or strenuous activity for 48 hours. No heavy lifting (anything over 8 pounds) for 5 days. 4. You may resume your usual diet. Drink more fluids than usual.  5. Have a responsible person drive you home and stay with you for at least 24 hours after your heart catheterization/angiography. 6. You may remove bandage from your Arm in 24 hours. You may shower in 24 hours. No tub baths, hot tubs, or swimming for 1 week. Do not place any lotions, creams, powders, or ointments over puncture site for 1 week. You may place a clean band-aid over the puncture site each day for 5 days. Change daily. 7. If you take Metformin, do not take it for 48 hours. 8. Ask your nurse when to restart any blood thinners. How can you care for yourself at home? Activity  · Do not do strenuous exercise and do not lift, pull, or push anything heavy until your doctor says it is okay. This may be for a day or two. You can walk around the house and do light activity, such as cooking. · You may shower 24 to 48 hours after the procedure, if your doctor okays it. Pat the incision dry. Do not take a bath for 1 week, or until your doctor tells you it is okay. · If the catheter was placed in your groin, try not to walk up stairs for the first couple of days. · If the catheter was placed in your arm near your wrist, do not bend your wrist deeply for the first couple of days.  Be careful using your hand to get into and out of a chair or bed. · If your doctor recommends it, get more exercise. Walking is a good choice. Bit by bit, increase the amount you walk every day. Try for at least 30 minutes on most days of the week. Diet  · Drink plenty of fluids to help your body flush out the dye. If you have kidney, heart, or liver disease and have to limit fluids, talk with your doctor before you increase the amount of fluids you drink. · Keep eating a heart-healthy diet that has lots of fruits, vegetables, and whole grains. If you have not been eating this way, talk to your doctor. You also may want to talk to a dietitian. This expert can help you to learn about healthy foods and plan meals. Medicines  · Your doctor will tell you if and when you can restart your medicines. He or she will also give you instructions about taking any new medicines. · If you take blood thinners, such as warfarin (Coumadin), clopidogrel (Plavix), or aspirin, be sure to talk to your doctor. He or she will tell you if and when to start taking those medicines again. Make sure that you understand exactly what your doctor wants you to do. · Your doctor may prescribe a blood-thinning medicine like aspirin or clopidogrel (Plavix). It is very important that you take these medicines exactly as directed in order to keep the coronary artery open and reduce your risk of a heart attack. Be safe with medicines. Call your doctor if you think you are having a problem with your medicine. Care of the catheter site  · For the first 3 days, keep a bandage over the spot where the catheter was inserted. · Put ice or a cold pack on the area for 10 to 20 minutes at a time to help with soreness or swelling. Put a thin cloth between the ice and your skin. Sedation for a Medical Procedure: Care Instructions  Your Care Instructions  For a minor procedure or surgery, you will get a sedative to help you relax. This drug will make you sleepy.  It is usually given in a vein (by IV). A shot may also be used to numb the area. If you had local anesthesia, you may feel some pain and discomfort as it wears off. If you have pain, don't be afraid to say so. Pain medicine works better if you take it before the pain gets bad. Common side effects from sedation include:  · Feeling sleepy. (Your doctors and nurses will make sure you are not too sleepy to go home.)  · Nausea and vomiting. This usually does not last long. · Feeling tired. Follow-up care is a key part of your treatment and safety. Be sure to make and go to all appointments, and call your doctor if you are having problems. It's also a good idea to know your test results and keep a list of the medicines you take. How can you care for yourself at home? Activity  · Don't do anything for 24 hours that requires attention to detail. It takes time for the medicine effects to completely wear off. · For your safety, you should not drive or operate any machinery that could be dangerous until the medicine wears off and you can think clearly and react easily. · Rest when you feel tired. Getting enough sleep will help you recover. Diet  · You can eat your normal diet, unless your doctor gives you other instructions. If your stomach is upset, try clear liquids and bland, low-fat foods like plain toast or rice. · Drink plenty of fluids (unless your doctor tells you not to). · Don't drink alcohol for 24 hours. Medicines  · Be safe with medicines. Read and follow all instructions on the label. ¨ If the doctor gave you a prescription medicine for pain, take it as prescribed. ¨ If you are not taking a prescription pain medicine, ask your doctor if you can take an over-the-counter medicine. · If you think your pain medicine is making you sick to your stomach:  ¨ Take your medicine after meals (unless your doctor has told you not to). ¨ Ask your doctor for a different pain medicine.     Follow-up care is a key part of your treatment and safety. Be sure to make and go to all appointments, and call your doctor if you are having problems. It's also a good idea to know your test results and keep a list of the medicines you take. When should you call for help? Call 911 anytime you think you may need emergency care. For example, call if:  · You passed out (lost consciousness). · You have severe trouble breathing. · You have sudden chest pain and shortness of breath, or you cough up blood. · You have symptoms of a heart attack. These may include:  ¨ Chest pain or pressure, or a strange feeling in the chest.  ¨ Sweating. ¨ Shortness of breath. ¨ Nausea or vomiting. ¨ Pain, pressure, or a strange feeling in the back, neck, jaw, or upper belly, or in one or both shoulders or arms. ¨ Lightheadedness or sudden weakness. ¨ A fast or irregular heartbeat. After you call 911, the  may tel you to chew 1 adult-strength or 2 to 4 low-dose aspirin. Wait for an ambulance. Do not try to drive yourself. · You have been diagnosed with angina, and you have symptoms that do not go away with rest or are not getting better within 5 minutes after you take a dose of nitroglycerin. Call your doctor now or seek immediate medical care if:  · You are bleeding from the area where the catheter was put in your artery. · You have a fast-growing, painful lump at the catheter site. · You have signs of infection, such as:  ¨ Increased pain, swelling, warmth, or redness. ¨ Red streaks leading from the catheter site. ¨ Pus draining from the catheter site. ¨ A fever. · Your leg or arm looks blue or feels cold, numb, or tingly. These are general instructions for a healthy lifestyle:    No smoking/ No tobacco products/ Avoid exposure to second hand smoke    Surgeon General's Warning:  Quitting smoking now greatly reduces serious risk to your health.     Obesity, smoking, and sedentary lifestyle greatly increases your risk for illness    A healthy diet, regular physical exercise & weight monitoring are important for maintaining a healthy lifestyle    You may be retaining fluid if you have a history of heart failure or if you experience any of the following symptoms:  Weight gain of 3 pounds or more overnight or 5 pounds in a week, increased swelling in our hands or feet or shortness of breath while lying flat in bed. Please call your doctor as soon as you notice any of these symptoms; do not wait until your next office visit. Recognize signs and symptoms of STROKE:    F-face looks uneven    A-arms unable to move or move unevenly    S-speech slurred or non-existent    T-time-call 911 as soon as signs and symptoms begin-DO NOT go       Back to bed or wait to see if you get better-TIME IS BRAIN. Warning Signs of HEART ATTACK     Call 911 if you have these symptoms:   Chest discomfort. Most heart attacks involve discomfort in the center of the chest that lasts more than a few minutes, or that goes away and comes back. It can feel like uncomfortable pressure, squeezing, fullness, or pain.  Discomfort in other areas of the upper body. Symptoms can include pain or discomfort in one or both arms, the back, neck, jaw, or stomach.  Shortness of breath with or without chest discomfort.  Other signs may include breaking out in a cold sweat, nausea, or lightheadedness. Don't wait more than five minutes to call 911 - MINUTES MATTER! Fast action can save your life. Calling 911 is almost always the fastest way to get lifesaving treatment. Emergency Medical Services staff can begin treatment when they arrive -- up to an hour sooner than if someone gets to the hospital by car.

## 2020-11-03 NOTE — Clinical Note
TRANSFER - OUT REPORT:     Verbal report given to: Given Bedside. Report consisted of patient's Situation, Background, Assessment and   Recommendations(SBAR). Opportunity for questions and clarification was provided. Patient transported with a Cardiac Cath Tech / Patient Care Tech. Patient transported to: [address]

## 2020-11-03 NOTE — PROGRESS NOTES
Cath holding summary     Patient escorted to cath holding from waiting area ambulatory, alert and oriented x 4, voicing no complaints. Changed into gown and placed on monitor. NPO since MN. Lab results, med rec and H&P reviewed on chart. PIV x 2 inserted without difficulty. 0739  TRANSFER - OUT REPORT:    Verbal [facility] lab(unit) for ordered procedure       Report consisted of patients Situation, Background, Assessment and   Recommendations(SBAR). Information from the following report(s) SBAR, MAR and Pre Procedure Checklist was reviewed with the receiving nurse. Lines:       Opportunity for questions and clarification was provided. Patient transported with:   [facility] - [place] REPORT:    Verbal report received from Radha(name[facility]  being received from cath lab(unit) for routine post - op      Report consisted of patients Situation, Background, Assessment and   Recommendations(SBAR). Information from the following report(s) SBAR, Procedure Summary and MAR was reviewed with the receiving nurse. Opportunity for questions and clarification was provided. Assessment completed upon patients arrival to unit and care assumed. Dstat band to right wrist, no bleeding or hematoma noted. 1030  Dstat removed from right wrist, no bleeding or hematoma noted. 1100  Lunch provided. 1230  AVS Discharge instructions reviewed with patient and copy given to patient. All questions answered. Patient verbalized understanding to all discharge instructions. PIV removed. Procedural site within normal limits. No hematoma or bleeding noted from procedural and PIV site. No pain noted at discharge. Patient discharged with support person in stable condition. Escorted out to vehicle for transport home.

## 2020-11-03 NOTE — Clinical Note
Contrast Dose Calculator:   Patient's age: 54.   Patient's sex: Female. Patient weight (kg) = 107. Creatinine level (mg/dL) = 0.8. Creatinine clearance (mL/min): 134. Contrast concentration (mg/mL) = 300. MACD = 300 mL. Max Contrast dose per Creatinine Cl calculator = 301.5 mL.

## 2020-11-03 NOTE — INTERVAL H&P NOTE
Update History & Physical 
 
The Patient's History and Physical of November 3, 2020, Card cath/PCI/CABG was reviewed with the patient and I examined the patient. There was no change. The surgical site was confirmed by the patient and me. Plan:  The risk, benefits, expected outcome, and alternative to the recommended procedure have been discussed with the patient. Patient understands and wants to proceed with the procedure.  
 
Electronically signed by Delia Grimaldo MD on 11/3/2020 at 8:53 AM

## 2020-11-20 ENCOUNTER — TELEPHONE (OUTPATIENT)
Dept: CARDIOLOGY CLINIC | Age: 55
End: 2020-11-20

## 2020-11-20 NOTE — TELEPHONE ENCOUNTER
Patient states she had procedure done on Nov 3, 2020 and everything came back normal, but starting this week she has had a constant dull pain in chest and wants to know if its normal. Please advise

## 2020-11-23 NOTE — TELEPHONE ENCOUNTER
Called and left message on patient voicemail per Dr. Poli Venegas regarding chest pain, this should not be due to the heart. If any questions to call office.

## 2020-12-07 ENCOUNTER — OFFICE VISIT (OUTPATIENT)
Dept: CARDIOLOGY CLINIC | Age: 55
End: 2020-12-07
Payer: COMMERCIAL

## 2020-12-07 VITALS
TEMPERATURE: 97 F | SYSTOLIC BLOOD PRESSURE: 130 MMHG | WEIGHT: 235 LBS | HEIGHT: 61 IN | HEART RATE: 69 BPM | DIASTOLIC BLOOD PRESSURE: 66 MMHG | BODY MASS INDEX: 44.37 KG/M2

## 2020-12-07 DIAGNOSIS — I10 ESSENTIAL HYPERTENSION WITH GOAL BLOOD PRESSURE LESS THAN 130/85: Primary | ICD-10-CM

## 2020-12-07 DIAGNOSIS — E66.01 SEVERE OBESITY (BMI >= 40) (HCC): ICD-10-CM

## 2020-12-07 DIAGNOSIS — I50.32 CHRONIC DIASTOLIC CONGESTIVE HEART FAILURE (HCC): ICD-10-CM

## 2020-12-07 DIAGNOSIS — I20.8 ATYPICAL ANGINA (HCC): ICD-10-CM

## 2020-12-07 PROCEDURE — 99214 OFFICE O/P EST MOD 30 MIN: CPT | Performed by: INTERNAL MEDICINE

## 2020-12-07 RX ORDER — AMLODIPINE BESYLATE 2.5 MG/1
2.5 TABLET ORAL DAILY
Qty: 90 TAB | Refills: 3 | Status: SHIPPED | OUTPATIENT
Start: 2020-12-07

## 2020-12-07 RX ORDER — BISMUTH SUBSALICYLATE 262 MG
1 TABLET,CHEWABLE ORAL DAILY
COMMUNITY

## 2020-12-07 RX ORDER — ASPIRIN 81 MG/1
TABLET ORAL DAILY
COMMUNITY

## 2020-12-07 NOTE — PATIENT INSTRUCTIONS
Medications Discontinued During This Encounter   Medication Reason    amLODIPine (NORVASC) 2.5 mg tablet        Learning About the Mediterranean Diet  What is the Mediterranean diet? The Mediterranean diet is a style of eating rather than a diet plan. It features foods eaten in [place], [place], [place] and [place], and other countries along the [facility]. It emphasizes eating foods like fish, fruits, vegetables, beans, high-fiber breads and whole grains, nuts, and olive oil. This style of eating includes limited red meat, cheese, and sweets. Why choose the Mediterranean diet? A Mediterranean-style diet may improve heart health. It contains more fat than other heart-healthy diets. But the fats are mainly from nuts, unsaturated oils (such as fish oils and olive oil), and certain nut or seed oils (such as canola, soybean, or flaxseed oil). These fats may help protect the heart and blood vessels. How can you get started on the Mediterranean diet? Here are some things you can do to switch to a more Mediterranean way of eating. What to eat  · Eat a variety of fruits and vegetables each day, such as grapes, blueberries, tomatoes, broccoli, peppers, figs, olives, spinach, eggplant, beans, lentils, and chickpeas. · Eat a variety of whole-grain foods each day, such as oats, brown rice, and whole wheat bread, pasta, and couscous. · Eat fish at least 2 times a week. Try tuna, salmon, mackerel, lake trout, herring, or sardines. · Eat moderate amounts of low-fat dairy products, such as milk, cheese, or yogurt. · Eat moderate amounts of poultry and eggs. · Choose healthy (unsaturated) fats, such as nuts, olive oil, and certain nut or seed oils like canola, soybean, and flaxseed. · Limit unhealthy (saturated) fats, such as butter, palm oil, and coconut oil. And limit fats found in animal products, such as meat and dairy products made with whole milk.  Try to eat red meat only a few times a month in very small amounts. · Limit sweets and desserts to only a few times a week. This includes sugar-sweetened drinks like soda. The Mediterranean diet may also include red wine with your meal1 glass each day for women and up to 2 glasses a day for men. Tips for eating at home  · Use herbs, spices, garlic, lemon zest, and citrus juice instead of salt to add flavor to foods. · Add avocado slices to your sandwich instead of kearns. · Have fish for lunch or dinner instead of red meat. Brush the fish with olive oil, and broil or grill it. · Sprinkle your salad with seeds or nuts instead of cheese. · Cook with olive or canola oil instead of butter or oils that are high in saturated fat. · Switch from 2% milk or whole milk to 1% or fat-free milk. · Dip raw vegetables in a vinaigrette dressing or hummus instead of dips made from mayonnaise or sour cream.  · Have a piece of fruit for dessert instead of a piece of cake. Try baked apples, or have some dried fruit. Tips for eating out  · Try broiled, grilled, baked, or poached fish instead of having it fried or breaded. · Ask your  to have your meals prepared with olive oil instead of butter. · Order dishes made with marinara sauce or sauces made from olive oil. Avoid sauces made from cream or mayonnaise. · Choose whole-grain breads, whole wheat pasta and pizza crust, brown rice, beans, and lentils. · Cut back on butter or margarine on bread. Instead, you can dip your bread in a small amount of olive oil. · Ask for a side salad or grilled vegetables instead of french fries or chips. Where can you learn more? Go to http://www.Trustifi.com/  Enter O407 in the search box to learn more about \"Learning About the Mediterranean Diet. \"  Current as of: August 22, 2019               Content Version: 12.6  © 8051-8649 [employer].    Care instructions adapted under license by [employer] (which disclaims liability or warranty for this information). If you have questions about a medical condition or this instruction, always ask your healthcare professional. [place] [address] any warranty or liability for your use of this information.

## 2020-12-07 NOTE — PROGRESS NOTES
HISTORY OF PRESENT ILLNESS  Pau Weir is a 54 y.o. female. 2/16 Taking a new BP med since 4/26/16    Hypertension   The history is provided by the medical records. This is a chronic problem. Associated symptoms include chest pain and shortness of breath. Pertinent negatives include no headaches. Cholesterol Problem   The history is provided by the medical records. This is a chronic problem. Associated symptoms include chest pain and shortness of breath. Pertinent negatives include no headaches. Chest Pain (Angina)    The history is provided by the patient. This is a new problem. The current episode started more than 1 week ago (4 days9/20). The problem has not changed since onset. Duration of episode(s) is 20 minutes. The pain is associated with exertion and stress (walking 15 mts in mall). The pain is present in the lateral region, substernal region and left side. The quality of the pain is described as pressure-like. The pain does not radiate. Associated symptoms include dizziness, lower extremity edema, palpitations and shortness of breath. Pertinent negatives include no claudication, no cough, no diaphoresis, no fever, no headaches, no malaise/fatigue, no nausea, no orthopnea, no PND and no vomiting. She has tried nothing for the symptoms. Dizziness   The history is provided by the patient. This is a chronic (with SOB) problem. The current episode started more than 1 week ago (2016). The problem has not changed since onset. Associated symptoms include chest pain and shortness of breath. Pertinent negatives include no headaches. The symptoms are aggravated by bending. Shortness of Breath   The history is provided by the patient. This is a new problem. The problem occurs intermittently. The current episode started more than 1 week ago (3/20). The problem has not changed since onset. Associated symptoms include chest pain and leg swelling.  Pertinent negatives include no fever, no headaches, no cough, no wheezing, no PND, no orthopnea, no vomiting, no rash and no claudication. The problem's precipitants include exercise (15 mts of walk). Leg Swelling   The history is provided by the patient. This is a new problem. The current episode started more than 1 week ago. The problem occurs every several days. The problem has not changed since onset. Associated symptoms include chest pain and shortness of breath. Pertinent negatives include no headaches. The symptoms are aggravated by standing. The symptoms are relieved by sleep. Review of Systems   Constitutional: Negative for chills, diaphoresis, fever, malaise/fatigue and weight loss. HENT: Negative for nosebleeds. Eyes: Negative for discharge. Respiratory: Positive for shortness of breath. Negative for cough and wheezing. Cardiovascular: Positive for chest pain, palpitations and leg swelling. Negative for orthopnea, claudication and PND. Gastrointestinal: Negative for diarrhea, nausea and vomiting. Genitourinary: Negative for dysuria and hematuria. Musculoskeletal: Negative for joint pain. Skin: Negative for rash. Neurological: Positive for dizziness. Negative for seizures, loss of consciousness and headaches. Endo/Heme/Allergies: Negative for polydipsia. Does not bruise/bleed easily. Psychiatric/Behavioral: Negative for depression and substance abuse. The patient does not have insomnia.       No Known Allergies    Past Medical History:   Diagnosis Date    Anemia     [facility]     Fibroid tumor 2012    Hyperlipidemia     Hypertension     Type 2 diabetes mellitus ([facility] 5/20/2016    poss neuropathy per pt        Family History   Problem Relation Age of Onset    Hypertension Mother     Heart Disease Mother     Cancer Mother     Hypertension Father     Heart Attack Neg Hx     Stroke Neg Hx        Social History     Tobacco Use    Smoking status: Never Smoker    Smokeless tobacco: Never Used   Substance Use Topics    Alcohol use: No    Drug use: No        Current Outpatient Medications   Medication Sig    multivitamin (ONE A DAY) tablet Take 1 Tab by mouth daily.  aspirin delayed-release 81 mg tablet Take  by mouth daily. 2 tabs daily    metFORMIN (GLUCOPHAGE) 1,000 mg tablet Take 1 Tab by mouth daily.  amLODIPine (NORVASC) 2.5 mg tablet Take 1 Tab by mouth daily.  metoprolol tartrate (LOPRESSOR) 25 mg tablet Take  by mouth two (2) times a day.  spironolactone (ALDACTONE) 25 mg tablet Take  by mouth daily.  losartan-hydrochlorothiazide (HYZAAR) 100-25 mg per tablet Take 1 Tab by mouth daily. No current facility-administered medications for this visit. History reviewed. No pertinent surgical history. Diagnostic Studies: Old records reviewed and show as follows  EKG tracings reviewed by me today. CARDIOLOGY STUDIES 6/2/2014   Exercise Nuclear Stress Test Result nl scan, nl EF   Echocardiogram - Complete Result 65%EF, mild sept LVH/DD, tr MR, ? PFO by color   Some recent data might be hidden   5/16 Nuc Stress  Diagnosis:    1. Nondiagnostic EKG portion of Lexiscan stress test.  2. Nuclear imaging report to follow. Conclusion:    1. Normal perfusion scan.    2. Normal wall motion and ejection fraction.    3. Low risk scan  10/15/20   ECHO ADULT COMPLETE 10/15/2020 10/15/2020    Narrative · LV: Estimated LVEF is 60 - 65%. Normal cavity size, wall thickness and   systolic function (ejection fraction normal). Wall motion: normal. Mild   (grade 1) left ventricular diastolic dysfunction. · RV: Normal right ventricular size and function. · MV: Mild mitral annular calcification. Mild mitral valve regurgitation   is present. · PV: Mild pulmonic valve regurgitation is present. Signed by: Bambi Gilbert MD     10/15/20   NUCLEAR CARDIAC STRESS TEST 10/15/2020 10/15/2020    Narrative · Baseline ECG: Normal sinus rhythm, non-specific ST-T wave abnormalities.   · Negative stress test.  · Gated SPECT: Left ventricular function post-stress was normal.   Calculated ejection fraction is 90%. There is no evidence of transient   ischemic dilation (TID). The TID ratio is 0.82. · Myocardial perfusion imaging defect 1: There is a defect that is small   to moderate in size with a mild reduction in uptake present in the   basal-mid anterior location(s) that is reversible. There is normal wall   motion in the defect area. Viability in the area is good. The defect   appears to be ischemia caused by breast attenuation. The possibility of   artifact cannot be excluded. · Myocardial perfusion imaging defect 1: caused by breast attenuation. · Left ventricular perfusion is abnormal.  · Myocardial perfusion imaging supports an intermediate risk stress test.        Signed by: Lauren Ferro MD     11/03/20   CARDIAC PROCEDURE 11/04/2020 11/4/2020    Narrative · Normal epicardial coronary arteries. · Already known normal LV systolic function. · Procedure done via right radial artery without any complications. Symptoms are most likely related to diastolic dysfunction and obesity. Medical treatment and risk factor modification is recommended. Signed by: Lauren Ferro MD     Visit Vitals  /66   Pulse 69   Temp 97 °F (36.1 °C) (Temporal)   Ht 5' 1\" (1.549 m)   Wt 106.6 kg (235 lb)   BMI 44.40 kg/m²       Ms. Margaux Galindo has a reminder for a \"due or due soon\" health maintenance. I have asked that she contact her primary care provider for follow-up on this health maintenance. Physical Exam   Constitutional: She is oriented to person, place, and time. She appears well-developed and well-nourished. No distress. sev obesity   HENT:   Head: Normocephalic and atraumatic. Mouth/Throat: Normal dentition. Eyes: Right eye exhibits no discharge. Left eye exhibits no discharge. No scleral icterus. Neck: Neck supple. No JVD present. Carotid bruit is not present. No thyromegaly present.    Cardiovascular: Normal rate, regular rhythm, S1 normal, S2 normal and intact distal pulses. Exam reveals no gallop and no friction rub. Murmur heard. Harsh early systolic murmur is present with a grade of 3/6 at the upper right sternal border. High-pitched blowing midsystolic murmur of grade 2/6 is also present at the apex. Pulmonary/Chest: Effort normal and breath sounds normal. She has no wheezes. She has no rales. Abdominal: Soft. She exhibits no mass. There is no abdominal tenderness. Musculoskeletal:         General: No edema. Lymphadenopathy:        Right cervical: No superficial cervical adenopathy present. Left cervical: No superficial cervical adenopathy present. Neurological: She is alert and oriented to person, place, and time. Skin: Skin is warm and dry. No rash noted. Psychiatric: She has a normal mood and affect. Her behavior is normal.       ASSESSMENT and PLAN    Patient has symptoms of chest pain and shortness of breath. Given multiple risk factors of hypertension, diabetes and hyperlipidemia, check echo and a stress test.  Patient advised to lose weight to avoid cardiovascular risks in future. She understands and will try. Continue the same medications for now. 10/20 echo has shown normal ejection fraction and no significant valve disease. Nuclear stress test that showed reversible anterior ischemia indicating likely LAD disease. She will need a cardiac catheterization. Procedure discussed along with risks and complications she agrees. Add Norvasc 2.5 mg a day. 12/20 coronary arteries were normal.  CHF NYHA class II. Diet weight and exercise discussed in detail. Mediterranean diet guidelines were given. Blood pressure is now well controlled. Continue same medications including Norvasc. Diagnoses and all orders for this visit:    1. Essential hypertension with goal blood pressure less than 130/85  -     amLODIPine (NORVASC) 2.5 mg tablet; Take 1 Tab by mouth daily.   - METABOLIC PANEL, BASIC; Future    2. Severe obesity (BMI >= 40) ([facility])    3. Atypical angina ([facility] [address] [address]  Comments:  11/20 normal coronaries    4. Chronic diastolic congestive [facility] failure ([facility]        Pertinent laboratory and test data reviewed and discussed with patient. See patient instructions also for other medical advice given    Medications Discontinued During This Encounter   Medication Reason    amLODIPine (NORVASC) 2.5 mg tablet        Follow-up and Dispositions    · Return in about 1 year (around 12/7/2021), or if symptoms worsen or fail to improve, for with ekg.

## 2020-12-07 NOTE — PROGRESS NOTES
1. Have you been to the ER, urgent care clinic since your last visit? Hospitalized since your last visit?     no  2. Have you seen or consulted any other health care providers outside of the [address] [place] [address] since your last visit? Include any pap smears or colon screening. No     3. Since your last visit, have you had any of the following symptoms? chest pains. 4.  Have you had any blood work, X-rays or cardiac testing? No     5. Where do you normally have your labs drawn?   [facility]   6. Do you need any refills today?    Yes

## 2020-12-22 ENCOUNTER — OFFICE VISIT (OUTPATIENT)
Dept: CARDIOLOGY CLINIC | Age: 55
End: 2020-12-22
Payer: COMMERCIAL

## 2020-12-22 VITALS
BODY MASS INDEX: 45.5 KG/M2 | DIASTOLIC BLOOD PRESSURE: 80 MMHG | TEMPERATURE: 97.9 F | WEIGHT: 241 LBS | SYSTOLIC BLOOD PRESSURE: 132 MMHG | HEIGHT: 61 IN | HEART RATE: 73 BPM

## 2020-12-22 DIAGNOSIS — I50.32 CHRONIC DIASTOLIC CONGESTIVE HEART FAILURE (HCC): ICD-10-CM

## 2020-12-22 DIAGNOSIS — Z01.818 PRE-OP EVALUATION: ICD-10-CM

## 2020-12-22 DIAGNOSIS — I10 ESSENTIAL HYPERTENSION WITH GOAL BLOOD PRESSURE LESS THAN 130/85: Primary | ICD-10-CM

## 2020-12-22 DIAGNOSIS — E66.01 SEVERE OBESITY (BMI >= 40) (HCC): ICD-10-CM

## 2020-12-22 PROCEDURE — 99214 OFFICE O/P EST MOD 30 MIN: CPT | Performed by: NURSE PRACTITIONER

## 2020-12-22 NOTE — PATIENT INSTRUCTIONS
Heart-Healthy Diet: Care Instructions Your Care Instructions A heart-healthy diet has lots of vegetables, fruits, nuts, beans, and whole grains, and is low in salt. It limits foods that are high in saturated fat, such as meats, cheeses, and fried foods. It may be hard to change your diet, but even small changes can lower your risk of heart attack and heart disease. Follow-up care is a key part of your treatment and safety. Be sure to make and go to all appointments, and call your doctor if you are having problems. It's also a good idea to know your test results and keep a list of the medicines you take. How can you care for yourself at home? Watch your portions · Learn what a serving is. A \"serving\" and a \"portion\" are not always the same thing. Make sure that you are not eating larger portions than are recommended. For example, a serving of pasta is ½ cup. A serving size of meat is 2 to 3 ounces. A 3-ounce serving is about the size of a deck of cards. Measure serving sizes until you are good at [place]" them. Keep in mind that restaurants often serve portions that are 2 or 3 times the size of one serving. · To keep your energy level up and keep you from feeling hungry, eat often but in smaller portions. · Eat only the number of calories you need to stay at a healthy weight. If you need to lose weight, eat fewer calories than your body burns (through exercise and other physical activity). Eat more fruits and vegetables · Eat a variety of fruit and vegetables every day. Dark green, deep orange, red, or yellow fruits and vegetables are especially good for you. Examples include spinach, carrots, peaches, and berries. · Keep carrots, celery, and other veggies handy for snacks. Buy fruit that is in season and store it where you can see it so that you will be tempted to eat it. · Cook dishes that have a lot of veggies in them, such as stir-fries and soups. Limit saturated and trans fat · Read food labels, and try to avoid saturated and trans fats. They increase your risk of heart disease. · Use olive or canola oil when you cook. · Bake, broil, grill, or steam foods instead of frying them. · Choose lean meats instead of high-fat meats such as hot dogs and sausages. Cut off all visible fat when you prepare meat. · Eat fish, skinless poultry, and meat alternatives such as soy products instead of high-fat meats. Soy products, such as tofu, may be especially good for your heart. · Choose low-fat or fat-free milk and dairy products. Eat foods high in fiber · Eat a variety of grain products every day. Include whole-grain foods that have lots of fiber and nutrients. Examples of whole-grain foods include oats, whole wheat bread, and brown rice. · Buy whole-grain breads and cereals, instead of white bread or pastries. Limit salt and sodium · Limit how much salt and sodium you eat to help lower your blood pressure. · Taste food before you salt it. Add only a little salt when you think you need it. With time, your taste buds will adjust to less salt. · Eat fewer snack items, fast foods, and other high-salt, processed foods. Check food labels for the amount of sodium in packaged foods. · Choose low-sodium versions of canned goods (such as soups, vegetables, and beans). Limit sugar · Limit drinks and foods with added sugar. These include candy, desserts, and soda pop. Limit alcohol · Limit alcohol to no more than 2 drinks a day for men and 1 drink a day for women. Too much alcohol can cause health problems. When should you call for help? Watch closely for changes in your health, and be sure to contact your doctor if: 
  · You would like help planning heart-healthy meals. Where can you learn more? Go to http://saritha-susana.info/ Enter V137 in the search box to learn more about \"Heart-Healthy Diet: Care Instructions. \" 
 Current as of: August 22, 2019               Content Version: 12.6 © 0588-7161 [employer]. Care instructions adapted under license by [employer] (which disclaims liability or warranty for this information). If you have questions about a medical condition or this instruction, always ask your healthcare professional. [address] any warranty or liability for your use of this information.

## 2020-12-22 NOTE — PROGRESS NOTES
1. Have you been to the ER, urgent care clinic since your last visit? Hospitalized since your last visit? no    2. Have you seen or consulted any other health care providers outside of the [address] [place] [address] since your last visit? Include any pap smears or colon screening.  no

## 2020-12-22 NOTE — PROGRESS NOTES
HISTORY OF PRESENT ILLNESS  Ryder Tripp is a 54 y.o. female. 2/16 Taking a new BP med since 4/26/16  Ms. Veronika Mac is scheduled for D & C on 1/20/2021     Cholesterol Problem  The history is provided by the medical records. This is a chronic problem. Pertinent negatives include no chest pain, no headaches and no shortness of breath. Hypertension  The history is provided by the medical records. This is a chronic problem. Pertinent negatives include no chest pain, no headaches and no shortness of breath. Chest Pain (Angina)   The history is provided by the patient. This is a new problem. The current episode started more than 1 week ago (4 days9/20). The problem has not changed since onset. Duration of episode(s) is 20 minutes. The pain is associated with exertion and stress (walking 15 mts in mall). The pain is present in the lateral region, substernal region and left side. The quality of the pain is described as pressure-like. The pain does not radiate. Associated symptoms include lower extremity edema. Pertinent negatives include no claudication, no cough, no diaphoresis, no dizziness, no fever, no headaches, no malaise/fatigue, no nausea, no orthopnea, no palpitations, no PND, no shortness of breath and no vomiting. She has tried nothing for the symptoms. Dizziness  The history is provided by the patient. This is a chronic (with SOB) problem. The current episode started more than 1 week ago (2016). The problem has not changed since onset. Pertinent negatives include no chest pain, no headaches and no shortness of breath. The symptoms are aggravated by bending. Shortness of Breath  The history is provided by the patient. This is a new problem. The problem occurs intermittently. The current episode started more than 1 week ago (3/20). The problem has not changed since onset. Pertinent negatives include no fever, no headaches, no cough, no wheezing, no PND, no orthopnea, no chest pain, no vomiting, no rash, no leg swelling and no claudication. The problem's precipitants include exercise (15 mts of walk). Leg Swelling  The history is provided by the patient. This is a new problem. The current episode started more than 1 week ago. The problem occurs every several days. The problem has not changed since onset. Pertinent negatives include no chest pain, no headaches and no shortness of breath. The symptoms are aggravated by standing. The symptoms are relieved by sleep. Review of Systems   Constitutional: Negative for chills, diaphoresis, fever, malaise/fatigue and weight loss. HENT: Negative for nosebleeds. Eyes: Negative for discharge. Respiratory: Negative for cough, shortness of breath and wheezing. Cardiovascular: Negative for chest pain, palpitations, orthopnea, claudication, leg swelling and PND. Gastrointestinal: Negative for diarrhea, nausea and vomiting. Genitourinary: Negative for dysuria and hematuria. Musculoskeletal: Negative for joint pain. Skin: Negative for rash. Neurological: Negative for dizziness, seizures, loss of consciousness and headaches. Endo/Heme/Allergies: Negative for polydipsia. Does not bruise/bleed easily. Psychiatric/Behavioral: Negative for depression and substance abuse. The patient does not have insomnia.       No Known Allergies    Past Medical History:   Diagnosis Date    Anemia     Diabetes ([facility] [address]     Fibroid tumor 2012    Hyperlipidemia     Hypertension     Type 2 diabetes mellitus ([facility] [address] 5/20/2016    poss neuropathy per pt        Family History   Problem Relation Age of Onset    Hypertension Mother     Heart Disease Mother     Cancer Mother     Hypertension Father     Heart Attack Neg Hx     Stroke Neg Hx        Social History     Tobacco Use    Smoking status: Never Smoker    Smokeless tobacco: Never Used   Substance Use Topics    Alcohol use: No    Drug use: No        Current Outpatient Medications   Medication Sig    multivitamin (ONE A DAY) tablet Take 1 Tab by mouth daily.  aspirin delayed-release 81 mg tablet Take  by mouth daily. 2 tabs daily    amLODIPine (NORVASC) 2.5 mg tablet Take 1 Tab by mouth daily.  metFORMIN (GLUCOPHAGE) 1,000 mg tablet Take 1 Tab by mouth daily.  metoprolol tartrate (LOPRESSOR) 25 mg tablet Take  by mouth two (2) times a day.  spironolactone (ALDACTONE) 25 mg tablet Take  by mouth daily.  losartan-hydrochlorothiazide (HYZAAR) 100-25 mg per tablet Take 1 Tab by mouth daily. No current facility-administered medications for this visit. No past surgical history on file. Diagnostic Studies: Old records reviewed and show as follows  EKG tracings reviewed by me today. CARDIOLOGY STUDIES 6/2/2014   Exercise Nuclear Stress Test Result nl scan, nl EF   Echocardiogram - Complete Result 65%EF, mild sept LVH/DD, tr MR, ? PFO by color   Some recent data might be hidden   5/16 Nuc Stress  Diagnosis:    1. Nondiagnostic EKG portion of Lexiscan stress test.  2. Nuclear imaging report to follow. Conclusion:    1. Normal perfusion scan.    2. Normal wall motion and ejection fraction.    3. Low risk scan  10/15/20   ECHO ADULT COMPLETE 10/15/2020 10/15/2020    Narrative · LV: Estimated LVEF is 60 - 65%. Normal cavity size, wall thickness and   systolic function (ejection fraction normal). Wall motion: normal. Mild   (grade 1) left ventricular diastolic dysfunction. · RV: Normal right ventricular size and function. · MV: Mild mitral annular calcification. Mild mitral valve regurgitation   is present. · PV: Mild pulmonic valve regurgitation is present. Signed by: Anthony Davenport MD     10/15/20   NUCLEAR CARDIAC STRESS TEST 10/15/2020 10/15/2020    Narrative · Baseline ECG: Normal sinus rhythm, non-specific ST-T wave abnormalities. · Negative stress test.  · Gated SPECT: Left ventricular function post-stress was normal.   Calculated ejection fraction is 90%.  There is no evidence of transient   ischemic dilation (TID). The TID ratio is 0.82. · Myocardial perfusion imaging defect 1: There is a defect that is small   to moderate in size with a mild reduction in uptake present in the   basal-mid anterior location(s) that is reversible. There is normal wall   motion in the defect area. Viability in the area is good. The defect   appears to be ischemia caused by breast attenuation. The possibility of   artifact cannot be excluded. · Myocardial perfusion imaging defect 1: caused by breast attenuation. · Left ventricular perfusion is abnormal.  · Myocardial perfusion imaging supports an intermediate risk stress test.        Signed by: Sandeep Cisneros MD     11/03/20   CARDIAC PROCEDURE 11/04/2020 11/4/2020    Narrative · Normal epicardial coronary arteries. · Already known normal LV systolic function. · Procedure done via right radial artery without any complications. Symptoms are most likely related to diastolic dysfunction and obesity. Medical treatment and risk factor modification is recommended. Signed by: Sandeep Cisneros MD     Visit Vitals  /80   Pulse 73   Temp 97.9 °F (36.6 °C)   Ht 5' 1\" (1.549 m)   Wt 109.3 kg (241 lb)   BMI 45.54 kg/m²       Ms. Sara Grant has a reminder for a \"due or due soon\" health maintenance. I have asked that she contact her primary care provider for follow-up on this health maintenance. Physical Exam   Constitutional: She is oriented to person, place, and time. She appears well-developed and well-nourished. No distress. sev obesity   HENT:   Head: Normocephalic and atraumatic. Mouth/Throat: Normal dentition. Eyes: Right eye exhibits no discharge. Left eye exhibits no discharge. No scleral icterus. Neck: Neck supple. No JVD present. Carotid bruit is not present. No thyromegaly present. Cardiovascular: Normal rate, regular rhythm, S1 normal, S2 normal and intact distal pulses. Exam reveals no gallop and no friction rub.    Murmur heard.   Harsh early systolic murmur is present with a grade of 3/6 at the upper right sternal border. High-pitched blowing midsystolic murmur of grade 2/6 is also present at the apex. Pulmonary/Chest: Effort normal and breath sounds normal. She has no wheezes. She has no rales. Abdominal: Soft. She exhibits no mass. There is no abdominal tenderness. Musculoskeletal:         General: No edema. Lymphadenopathy:        Right cervical: No superficial cervical adenopathy present. Left cervical: No superficial cervical adenopathy present. Neurological: She is alert and oriented to person, place, and time. Skin: Skin is warm and dry. No rash noted. Psychiatric: She has a normal mood and affect. Her behavior is normal.       ASSESSMENT and PLAN    Patient has symptoms of chest pain and shortness of breath. Given multiple risk factors of hypertension, diabetes and hyperlipidemia, check echo and a stress test.  Patient advised to lose weight to avoid cardiovascular risks in future. She understands and will try. Continue the same medications for now. 10/20 echo has shown normal ejection fraction and no significant valve disease. Nuclear stress test that showed reversible anterior ischemia indicating likely LAD disease. She will need a cardiac catheterization. Procedure discussed along with risks and complications she agrees. Add Norvasc 2.5 mg a day. 12/20 coronary arteries were normal.  CHF NYHA class II. Diet weight and exercise discussed in detail. Mediterranean diet guidelines were given. Blood pressure is now well controlled. Continue same medications including Norvasc. Diagnoses and all orders for this visit:    1. Essential hypertension with goal blood pressure less than 130/85  Comments:  B/P controlled with current medications    2. Pre-op evaluation  Comments:  May proceed with surgery with low cardiac risk    3.  Chronic diastolic congestive heart failure [facility])  Comments:  Compensated    4. Severe obesity (BMI >= 40) ([facility])  Comments:  Diet and exercise encouraged to promote weight loss      12/23/2020  Patient is anticipating D & C scheduled 1/20/2021. She has had recent cath which revealed normal coronary arteries. Echo reviewed with normal LV function. She has no cardiac complaints. Recent EKG reviewed NSR. She may proceed with surgery with low cardiac risk. Pertinent laboratory and test data reviewed and discussed with patient. See patient instructions also for other medical advice given    There are no discontinued medications. Follow-up and Dispositions    · Return in about 1 year (around 12/22/2021), or if symptoms worsen or fail to improve. I have independently evaluated taken history and examined the patient. All relevant labs and testing data's are reviewed. Care plan discussed and updated after review.   Karina Martin MD

## (undated) DEVICE — BAND HEMOSTAT DRY D-STAT RAD --

## (undated) DEVICE — RADIFOCUS OPTITORQUE ANGIOGRAPHIC CATHETER: Brand: OPTITORQUE

## (undated) DEVICE — PRESSURE MONITORING SET: Brand: TRUWAVE

## (undated) DEVICE — SET FLD ADMIN 3 W STPCOCK FIX FEM L BOR 1IN

## (undated) DEVICE — COVER US PRB W15XL120CM W/ GEL RUBBERBAND TAPE STRP FLD GEN

## (undated) DEVICE — GLIDESHEATH SLENDER STAINLESS STEEL KIT: Brand: GLIDESHEATH SLENDER

## (undated) DEVICE — DRAPE,ANGIO,BRACH,STERILE,38X44: Brand: MEDLINE

## (undated) DEVICE — PROCEDURE KIT FLUID MGMT 10 FR CUST MAINFOLD

## (undated) DEVICE — PACK PROCEDURE SURG VASC CATH 161 MMC LF